# Patient Record
Sex: FEMALE | Race: WHITE | NOT HISPANIC OR LATINO
[De-identification: names, ages, dates, MRNs, and addresses within clinical notes are randomized per-mention and may not be internally consistent; named-entity substitution may affect disease eponyms.]

---

## 2021-01-17 ENCOUNTER — TRANSCRIPTION ENCOUNTER (OUTPATIENT)
Age: 46
End: 2021-01-17

## 2021-02-14 ENCOUNTER — TRANSCRIPTION ENCOUNTER (OUTPATIENT)
Age: 46
End: 2021-02-14

## 2021-08-02 ENCOUNTER — APPOINTMENT (OUTPATIENT)
Dept: PEDIATRIC ALLERGY IMMUNOLOGY | Facility: CLINIC | Age: 46
End: 2021-08-02
Payer: COMMERCIAL

## 2021-08-02 VITALS
TEMPERATURE: 98.1 F | RESPIRATION RATE: 16 BRPM | BODY MASS INDEX: 30.55 KG/M2 | SYSTOLIC BLOOD PRESSURE: 133 MMHG | DIASTOLIC BLOOD PRESSURE: 80 MMHG | HEART RATE: 90 BPM | WEIGHT: 166 LBS | OXYGEN SATURATION: 97 % | HEIGHT: 62 IN

## 2021-08-02 DIAGNOSIS — Z91.013 ALLERGY TO SEAFOOD: ICD-10-CM

## 2021-08-02 DIAGNOSIS — J45.30 MILD PERSISTENT ASTHMA, UNCOMPLICATED: ICD-10-CM

## 2021-08-02 DIAGNOSIS — R52 PAIN, UNSPECIFIED: ICD-10-CM

## 2021-08-02 DIAGNOSIS — Z88.0 ALLERGY STATUS TO PENICILLIN: ICD-10-CM

## 2021-08-02 DIAGNOSIS — Z88.6 ALLERGY STATUS TO ANALGESIC AGENT: ICD-10-CM

## 2021-08-02 PROCEDURE — 95004 PERQ TESTS W/ALRGNC XTRCS: CPT

## 2021-08-02 PROCEDURE — 99204 OFFICE O/P NEW MOD 45 MIN: CPT | Mod: 25

## 2021-08-02 RX ORDER — MONTELUKAST SODIUM 10 MG/1
10 TABLET, FILM COATED ORAL
Refills: 0 | Status: ACTIVE | COMMUNITY

## 2021-08-02 RX ORDER — RABEPRAZOLE SODIUM 20 MG/1
20 TABLET, DELAYED RELEASE ORAL
Refills: 0 | Status: ACTIVE | COMMUNITY

## 2021-08-02 RX ORDER — ALBUTEROL SULFATE 90 UG/1
108 AEROSOL, METERED RESPIRATORY (INHALATION)
Refills: 0 | Status: ACTIVE | COMMUNITY

## 2021-08-02 RX ORDER — CELECOXIB 200 MG/1
200 CAPSULE ORAL DAILY
Qty: 30 | Refills: 0 | Status: ACTIVE | COMMUNITY
Start: 2021-08-02 | End: 1900-01-01

## 2021-08-02 NOTE — PHYSICAL EXAM
[Alert] : alert [Normal Pupil & Iris Size/Symmetry] : normal pupil and iris size and symmetry [Normal TMs] : both tympanic membranes were normal [Normal Nasal Mucosa] : the nasal mucosa was normal [Normal Lips/Tongue] : the lips and tongue were normal [Normal Outer Ear/Nose] : the ears and nose were normal in appearance [No Nasal Discharge] : no nasal discharge [Normal Tonsils] : normal tonsils [Normal Rate and Effort] : normal respiratory rhythm and effort [Bilateral Audible Breath Sounds] : bilateral audible breath sounds [Skin Intact] : skin intact  [No Neck Mass] : no neck mass was observed [Normal Rate] : heart rate was normal  [Pale mucosa] : no pale mucosa [Boggy Nasal Turbinates] : no boggy and/or pale nasal turbinates [Posterior Pharyngeal Cobblestoning] : no posterior pharyngeal cobblestoning [Clear Rhinorrhea] : no clear rhinorrhea was seen [de-identified] : minimal wax b/l

## 2021-08-02 NOTE — SOCIAL HISTORY
[Father] : father [House] : [unfilled] lives in a house  [Radiator/Baseboard] : heating provided by radiator(s)/baseboard(s) [Central] : air conditioning provided by central unit [Humidifier] : uses a humidifier [Dehumidifier] : uses a dehumidifier [Dry] : dry [Dust Mite Covers] : has dust mite covers [Bedroom] :  in bedroom [Dog] : dog [Laundry Detergents] : laundry detergents [] :  [de-identified] : 2 sons [FreeTextEntry1] : masters degree [FreeTextEntry2] : teacher [Feather Pillows] : does not have feather pillows [Feather Comforter] : does not have a feather comforter [Basement] : not in the basement [Living Area] : not in the living area [Smokers in Household] : there are no smokers in the home [de-identified] : 2 dogs [de-identified] : tide [de-identified] : walking,dancing

## 2021-08-02 NOTE — ASSESSMENT
[FreeTextEntry1] : 46 y.o female presents for evaluation of possible shrimp allergy, allergy to PCN and NSAID and mild AR\par \par Skin test to shrimp today were negative\par Suggestion were made for fresh shrimp skin test and possible challenge if patient is interested.  Patient would like to consider her options and will call us with a decision. We also offered an EpiPen which she declined.\par \par PCN allergy skin test and challenge were suggested and she will consider for a later time. \par \par Pt has history of urticaria following Advil use but is OK with ASA and Tylenol  Oral challenge to NSAID was discussed but at this point it is easier to use ACKERMAN-2 inh such as Celebrex 200 mg PRN or Tylenol   Advil challenge may be consider if either of those two agents if ineffective. .\par \par Total MD time spent on this encounter was 45 minutes.  This includes time devoted to preparing to see the patient with review of previous medical record, obtaining medical history, performing physical exam, counseling and patient education with patient and family, ordering medications and lab studies, documentation in the medical record and coordination of care.\par

## 2021-08-02 NOTE — HISTORY OF PRESENT ILLNESS
[de-identified] : 46 y.o female presents with history of a shrimp reaction 20 years ago. She states she reacted to shrimp three separate times in a six month span of time. Each time the shrimp was consumed with an alcoholic beverage. With each episode she experienced tingling and numbness of tongue and gums with a periocular rash. She saw an allergist  at the time where Skin tests were negative.  She however was very anxious about re-consuming shrimp and has not had any since.   She used to carry an Epipen but has not refilled it in years. She does eat other shellfish including other crustaceans and mollusks without any clinical reaction. \par \par Patient also has a longstanding history of allergies to both penicillin and Advil. As a child she was always told she was allergic to Penicillin and believes she might have had a rash. She is able to tolerate cephalosporins.  Advil reaction was noted about 30 years ago. She broke out in hives on her face which resolved with Benadryl . She does take Excedrin extra strength which does contain ASA. She may have a need for NSAIDs with a recent finger pain/injury that had to be treated with oral steroids.\par \par She also has mild persistent asthma treated with Singulair 10mg nightly. Albuterol is rarely used. She also has underlying environmental allergies but states she is  asymptomatic.

## 2021-08-02 NOTE — REVIEW OF SYSTEMS
[Immunizations are up to date] : Immunizations are up to date [Received Influenza Vaccine this Past Year] : patient has received the Influenza vaccine this past year [Nl] : Cardiovascular

## 2024-07-26 ENCOUNTER — APPOINTMENT (OUTPATIENT)
Dept: COLORECTAL SURGERY | Facility: CLINIC | Age: 49
End: 2024-07-26
Payer: COMMERCIAL

## 2024-07-26 VITALS
RESPIRATION RATE: 15 BRPM | BODY MASS INDEX: 31.1 KG/M2 | OXYGEN SATURATION: 100 % | HEIGHT: 62 IN | SYSTOLIC BLOOD PRESSURE: 152 MMHG | DIASTOLIC BLOOD PRESSURE: 97 MMHG | WEIGHT: 169 LBS | HEART RATE: 98 BPM | TEMPERATURE: 98 F

## 2024-07-26 DIAGNOSIS — Z78.9 OTHER SPECIFIED HEALTH STATUS: ICD-10-CM

## 2024-07-26 DIAGNOSIS — K62.5 HEMORRHAGE OF ANUS AND RECTUM: ICD-10-CM

## 2024-07-26 DIAGNOSIS — Z80.0 FAMILY HISTORY OF MALIGNANT NEOPLASM OF DIGESTIVE ORGANS: ICD-10-CM

## 2024-07-26 DIAGNOSIS — K64.4 RESIDUAL HEMORRHOIDAL SKIN TAGS: ICD-10-CM

## 2024-07-26 DIAGNOSIS — K64.8 OTHER HEMORRHOIDS: ICD-10-CM

## 2024-07-26 PROCEDURE — 46221 LIGATION OF HEMORRHOID(S): CPT

## 2024-07-26 PROCEDURE — 99244 OFF/OP CNSLTJ NEW/EST MOD 40: CPT | Mod: 25

## 2024-07-26 RX ORDER — TIRZEPATIDE 15 MG/.5ML
INJECTION, SOLUTION SUBCUTANEOUS
Refills: 0 | Status: ACTIVE | COMMUNITY

## 2024-07-26 RX ORDER — PHENTERMINE HYDROCHLORIDE 30 MG/1
30 CAPSULE ORAL
Refills: 0 | Status: ACTIVE | COMMUNITY

## 2024-07-26 RX ORDER — DEXLANSOPRAZOLE 60 MG/1
CAPSULE, DELAYED RELEASE ORAL
Refills: 0 | Status: ACTIVE | COMMUNITY

## 2024-07-26 RX ORDER — MULTIVITAMIN
TABLET ORAL
Refills: 0 | Status: ACTIVE | COMMUNITY

## 2024-07-26 NOTE — REVIEW OF SYSTEMS
[As Noted in HPI] : as noted in HPI [Constipation] : constipation [Anxiety] : anxiety [Negative] : Heme/Lymph [FreeTextEntry6] : asthma [FreeTextEntry9] : TMJ

## 2024-07-26 NOTE — CONSULT LETTER
[Dear  ___] : Dear  [unfilled], [Consult Letter:] : I had the pleasure of evaluating your patient, [unfilled]. [Please see my note below.] : Please see my note below. [Consult Closing:] : Thank you very much for allowing me to participate in the care of this patient.  If you have any questions, please do not hesitate to contact me. [Sincerely,] : Sincerely, [FreeTextEntry3] : Scott Morgan MD, FACS, FASCRS Colorectal Surgery [facility] Assistant Professor of Surgery [employer] at [address], [address] [place] [address] Tel: [phone]  Cell: [phone]  Email: [email or web address]

## 2024-07-26 NOTE — PROCEDURE
[FreeTextEntry1] : Suction rubber band ligation performed on the right anterior hemorrhoid.  Patient tolerated it well

## 2024-07-26 NOTE — HISTORY OF PRESENT ILLNESS
[FreeTextEntry1] : 49 year old female presents with hemorrhoids. Patient is experiencing intermittent painless rectal bleeding associated with her BMs. The blood is bright red and is both in the bowl and on the tissue. She first noticed her symptoms around the time she started weight loss medication which caused her to be constipated more often.  She has since switched the weight loss medication and her bowel movements have returned to soft and easy.  She has also noticed less episodes of bleeding since switching drugs. She denies any rectal pain.   She had a colonoscopy was in 11/2019 - reportedly normal. Her mother had colon cancer.

## 2024-07-26 NOTE — PHYSICAL EXAM
[Normal Breath Sounds] : Normal breath sounds [Normal Heart Sounds] : normal heart sounds [Normal Rate and Rhythm] : normal rate and rhythm [No Rash or Lesion] : No rash or lesion [Alert] : alert [Oriented to Person] : oriented to person [Oriented to Place] : oriented to place [Oriented to Time] : oriented to time [Calm] : calm [Excoriation] : no perianal excoriation [Fistula] : no fistulas [Wart] : no warts [Ulcer ___ cm] : no ulcers [Normal] : was normal [None] : there was no rectal mass  [de-identified] : soft, NT/ND, +BS [de-identified] : mildly enlarged external hemorrhoids in the left lateral and right anterior positions [de-identified] : Anoscopy reveals mildly enlarged internal hemorrhoids largest in the right anterior position [de-identified] : well nourished female [de-identified] : NC/AT [de-identified] : ALPHONSE/+ROM [de-identified] : intact

## 2024-07-26 NOTE — ASSESSMENT
[FreeTextEntry1] : We discussed the options for hemorrhoids including topical treatment, office procedures (namely rubber band ligation), and operative intervention (THD vs hemorrhoidectomy). The r/b/a of the procedures were discussed including but not limited to pain, bleeding, and infection. She will follow-up in another 3 to 4 weeks if she has persistent symptoms

## 2025-01-19 ENCOUNTER — NON-APPOINTMENT (OUTPATIENT)
Age: 50
End: 2025-01-19

## 2025-02-11 ENCOUNTER — APPOINTMENT (OUTPATIENT)
Facility: CLINIC | Age: 50
End: 2025-02-11
Payer: COMMERCIAL

## 2025-02-11 VITALS
RESPIRATION RATE: 18 BRPM | TEMPERATURE: 96 F | HEIGHT: 62 IN | BODY MASS INDEX: 28.52 KG/M2 | HEART RATE: 96 BPM | OXYGEN SATURATION: 98 % | WEIGHT: 155 LBS | SYSTOLIC BLOOD PRESSURE: 137 MMHG | DIASTOLIC BLOOD PRESSURE: 89 MMHG

## 2025-02-11 DIAGNOSIS — R49.0 DYSPHONIA: ICD-10-CM

## 2025-02-11 DIAGNOSIS — K21.9 GASTRO-ESOPHAGEAL REFLUX DISEASE W/OUT ESOPHAGITIS: ICD-10-CM

## 2025-02-11 DIAGNOSIS — J45.30 MILD PERSISTENT ASTHMA, UNCOMPLICATED: ICD-10-CM

## 2025-02-11 DIAGNOSIS — K64.8 OTHER HEMORRHOIDS: ICD-10-CM

## 2025-02-11 DIAGNOSIS — J11.1 INFLUENZA DUE TO UNIDENTIFIED INFLUENZA VIRUS WITH OTHER RESPIRATORY MANIFESTATIONS: ICD-10-CM

## 2025-02-11 DIAGNOSIS — J38.1 POLYP OF VOCAL CORD AND LARYNX: ICD-10-CM

## 2025-02-11 PROCEDURE — 99202 OFFICE O/P NEW SF 15 MIN: CPT

## 2025-02-11 RX ORDER — BUDESONIDE, GLYCOPYRROLATE, AND FORMOTEROL FUMARATE 160; 9; 4.8 UG/1; UG/1; UG/1
160-9-4.8 AEROSOL, METERED RESPIRATORY (INHALATION) TWICE DAILY
Qty: 1 | Refills: 5 | Status: ACTIVE | COMMUNITY
Start: 2025-02-11 | End: 1900-01-01

## 2025-02-23 RX ORDER — PREDNISONE 20 MG/1
20 TABLET ORAL DAILY
Qty: 14 | Refills: 1 | Status: ACTIVE | COMMUNITY
Start: 2025-02-11 | End: 1900-01-01

## 2025-05-25 ENCOUNTER — NON-APPOINTMENT (OUTPATIENT)
Age: 50
End: 2025-05-25

## 2025-05-29 ENCOUNTER — NON-APPOINTMENT (OUTPATIENT)
Age: 50
End: 2025-05-29

## 2025-08-19 ENCOUNTER — APPOINTMENT (OUTPATIENT)
Facility: CLINIC | Age: 50
End: 2025-08-19